# Patient Record
Sex: FEMALE | Race: WHITE | NOT HISPANIC OR LATINO | URBAN - METROPOLITAN AREA
[De-identification: names, ages, dates, MRNs, and addresses within clinical notes are randomized per-mention and may not be internally consistent; named-entity substitution may affect disease eponyms.]

---

## 2020-06-11 ENCOUNTER — OFFICE VISIT (OUTPATIENT)
Dept: URBAN - METROPOLITAN AREA CLINIC 60 | Facility: CLINIC | Age: 70
End: 2020-06-11

## 2022-07-09 ENCOUNTER — TELEPHONE ENCOUNTER (OUTPATIENT)
Dept: URBAN - METROPOLITAN AREA CLINIC 121 | Facility: CLINIC | Age: 72
End: 2022-07-09

## 2022-07-09 RX ORDER — UBIDECARENONE 200 MG
CAPSULE ORAL ONCE A DAY
Refills: 0 | OUTPATIENT
Start: 2013-06-14 | End: 2018-10-05

## 2022-07-09 RX ORDER — DEXTROMETHORPHAN POLISTIREX 30 MG/5 ML
SUSPENSION, EXTENDED RELEASE 12 HR ORAL ONCE A DAY
Refills: 0 | OUTPATIENT
Start: 2013-06-14 | End: 2018-10-05

## 2022-07-09 RX ORDER — ASPIRIN 81 MG/1
TABLET, COATED ORAL ONCE A DAY
Refills: 0 | OUTPATIENT
Start: 2018-10-05 | End: 2020-06-11

## 2022-07-09 RX ORDER — DEXTROMETHORPHAN POLISTIREX 30 MG/5 ML
SUSPENSION, EXTENDED RELEASE 12 HR ORAL ONCE A DAY
Refills: 0 | OUTPATIENT
Start: 2018-10-05 | End: 2020-06-11

## 2022-07-09 RX ORDER — OMEPRAZOLE 40 MG/1
CAPSULE, DELAYED RELEASE ORAL ONCE A DAY
Refills: 0 | OUTPATIENT
Start: 2020-03-05 | End: 2020-06-11

## 2022-07-09 RX ORDER — PAROXETINE HYDROCHLORIDE 20 MG/1
TABLET, FILM COATED ORAL ONCE A DAY
Refills: 0 | OUTPATIENT
Start: 2018-10-05 | End: 2020-06-11

## 2022-07-09 RX ORDER — LEVOTHYROXINE SODIUM 88 MCG
TABLET ORAL ONCE A DAY
Refills: 0 | OUTPATIENT
Start: 2018-10-05 | End: 2020-06-11

## 2022-07-09 RX ORDER — ASPIRIN 81 MG/1
TABLET, COATED ORAL ONCE A DAY
Refills: 0 | OUTPATIENT
Start: 2013-06-14 | End: 2018-10-05

## 2022-07-09 RX ORDER — SIMVASTATIN 20 MG/1
TABLET, FILM COATED ORAL ONCE A DAY
Refills: 0 | OUTPATIENT
Start: 2018-10-05 | End: 2020-06-11

## 2022-07-09 RX ORDER — ASPIRIN/ACETAMINOPHEN/CAFFEINE 250-250-65
TABLET ORAL ONCE A DAY
Refills: 0 | OUTPATIENT
Start: 2013-06-14 | End: 2018-10-05

## 2022-07-10 ENCOUNTER — TELEPHONE ENCOUNTER (OUTPATIENT)
Dept: URBAN - METROPOLITAN AREA CLINIC 121 | Facility: CLINIC | Age: 72
End: 2022-07-10

## 2022-07-10 RX ORDER — LEVOTHYROXINE SODIUM 88 MCG
TABLET ORAL ONCE A DAY
Refills: 0 | Status: ACTIVE | COMMUNITY
Start: 2020-06-11

## 2022-07-10 RX ORDER — SODIUM SULFATE, POTASSIUM SULFATE, MAGNESIUM SULFATE 17.5; 3.13; 1.6 G/ML; G/ML; G/ML
SOLUTION, CONCENTRATE ORAL TAKE AS DIRECTED
Refills: 0 | Status: ACTIVE | COMMUNITY
Start: 2013-06-14

## 2022-07-10 RX ORDER — DEXTROMETHORPHAN POLISTIREX 30 MG/5 ML
SUSPENSION, EXTENDED RELEASE 12 HR ORAL ONCE A DAY
Refills: 0 | Status: ACTIVE | COMMUNITY
Start: 2020-06-11

## 2022-07-10 RX ORDER — PAROXETINE HYDROCHLORIDE 20 MG/1
TABLET, FILM COATED ORAL ONCE A DAY
Refills: 0 | Status: ACTIVE | COMMUNITY
Start: 2020-06-11

## 2022-07-10 RX ORDER — OMEPRAZOLE 40 MG/1
CAPSULE, DELAYED RELEASE ORAL ONCE A DAY
Refills: 0 | Status: ACTIVE | COMMUNITY
Start: 2020-06-11

## 2022-07-10 RX ORDER — VIT A/VIT C/VIT E/ZINC/COPPER 2148-113
TABLET ORAL ONCE A DAY
Refills: 0 | Status: ACTIVE | COMMUNITY
Start: 2020-06-11

## 2022-07-10 RX ORDER — NYSTATIN 100000 [USP'U]/ML
SUSPENSION ORAL
Refills: 0 | Status: ACTIVE | COMMUNITY
Start: 2020-06-11

## 2022-07-10 RX ORDER — HYDROCODONE BITARTRATE AND ACETAMINOPHEN 325; 10 MG/1; MG/1
TABLET ORAL AS NEEDED
Refills: 0 | Status: ACTIVE | COMMUNITY
Start: 2020-06-11

## 2022-07-10 RX ORDER — SIMVASTATIN 40 MG/1
TABLET, FILM COATED ORAL ONCE A DAY
Refills: 0 | Status: ACTIVE | COMMUNITY
Start: 2020-06-11

## 2022-07-10 RX ORDER — ASPIRIN 81 MG/1
TABLET, COATED ORAL ONCE A DAY
Refills: 0 | Status: ACTIVE | COMMUNITY
Start: 2020-06-11

## 2022-10-28 ENCOUNTER — WEB ENCOUNTER (OUTPATIENT)
Dept: URBAN - METROPOLITAN AREA CLINIC 60 | Facility: CLINIC | Age: 72
End: 2022-10-28

## 2022-11-03 ENCOUNTER — DASHBOARD ENCOUNTERS (OUTPATIENT)
Age: 72
End: 2022-11-03

## 2022-11-03 ENCOUNTER — OFFICE VISIT (OUTPATIENT)
Dept: URBAN - METROPOLITAN AREA CLINIC 60 | Facility: CLINIC | Age: 72
End: 2022-11-03
Payer: MEDICARE

## 2022-11-03 ENCOUNTER — WEB ENCOUNTER (OUTPATIENT)
Dept: URBAN - METROPOLITAN AREA CLINIC 60 | Facility: CLINIC | Age: 72
End: 2022-11-03

## 2022-11-03 VITALS
OXYGEN SATURATION: 97 % | TEMPERATURE: 97.6 F | HEART RATE: 52 BPM | DIASTOLIC BLOOD PRESSURE: 68 MMHG | SYSTOLIC BLOOD PRESSURE: 140 MMHG | HEIGHT: 64 IN | WEIGHT: 214.6 LBS | BODY MASS INDEX: 36.64 KG/M2 | RESPIRATION RATE: 12 BRPM

## 2022-11-03 DIAGNOSIS — K30 FUNCTIONAL DYSPEPSIA: ICD-10-CM

## 2022-11-03 PROBLEM — 3696007: Status: ACTIVE | Noted: 2022-11-03

## 2022-11-03 PROCEDURE — 99213 OFFICE O/P EST LOW 20 MIN: CPT | Performed by: NURSE PRACTITIONER

## 2022-11-03 RX ORDER — OMEPRAZOLE 40 MG/1
CAPSULE, DELAYED RELEASE ORAL ONCE A DAY
Refills: 0 | Status: ACTIVE | COMMUNITY
Start: 2020-06-11

## 2022-11-03 RX ORDER — SIMVASTATIN 40 MG/1
TABLET, FILM COATED ORAL ONCE A DAY
Refills: 0 | Status: ACTIVE | COMMUNITY
Start: 2020-06-11

## 2022-11-03 RX ORDER — DEXTROMETHORPHAN POLISTIREX 30 MG/5 ML
SUSPENSION, EXTENDED RELEASE 12 HR ORAL ONCE A DAY
Refills: 0 | Status: ACTIVE | COMMUNITY
Start: 2020-06-11

## 2022-11-03 RX ORDER — VIT A/VIT C/VIT E/ZINC/COPPER 2148-113
TABLET ORAL ONCE A DAY
Refills: 0 | Status: ACTIVE | COMMUNITY
Start: 2020-06-11

## 2022-11-03 RX ORDER — AMLODIPINE BESYLATE 10 MG/1
1 TABLET TABLET ORAL ONCE A DAY
Status: ACTIVE | COMMUNITY

## 2022-11-03 RX ORDER — LEVOTHYROXINE SODIUM 88 MCG
TABLET ORAL ONCE A DAY
Refills: 0 | Status: ACTIVE | COMMUNITY
Start: 2020-06-11

## 2022-11-03 RX ORDER — PAROXETINE HYDROCHLORIDE 20 MG/1
TABLET, FILM COATED ORAL ONCE A DAY
Refills: 0 | Status: ACTIVE | COMMUNITY
Start: 2020-06-11

## 2022-11-03 RX ORDER — ASPIRIN 81 MG/1
TABLET, COATED ORAL ONCE A DAY
Refills: 0 | Status: ACTIVE | COMMUNITY
Start: 2020-06-11

## 2022-11-03 RX ORDER — PANTOPRAZOLE SODIUM 40 MG/1
1 TABLET TABLET, DELAYED RELEASE ORAL ONCE A DAY
Qty: 90 TABLET | Refills: 2 | OUTPATIENT
Start: 2022-11-03

## 2022-11-03 NOTE — HPI-TODAY'S VISIT:
Ms. Ugalde is a pleasant 72-year-old female evaluated with complaint of vomiting and diarrhea. She was previously evaluated 06/11/2020, records under previous EHR are unavailable.  Today, she reports history of abdominal cramping with vomiting and diarrhea occuring on a single day about once every 5 months.  In September 2022 was having symptoms weekly.    She HIDA and CT scan that were both negative. When having vomiting, will be of food recently consumed.  Will occur in the evening. Symptoms unrelated to eating or BM.  Having daily BM of soft consistency. Taking omeprazole 40 mg once daily.

## 2022-11-03 NOTE — HPI-PREVIOUS IMAGING
HIDA scan 10/24/2022 Pressure: No scintigraphic evidence for acute cholecystitis.  The gallbladder ejection fraction is 66% at 30 minutes.  CT abdomen and pelvis without contrast 10/18/2022 Impression: No evidence of obstructive uropathy.  Renal, ureteral or bladder calculi are not apparent. Findings also include some mucosal thickening involving the distal esophagus. See records for full results.

## 2022-11-03 NOTE — HPI-PREVIOUS PROCEDURES
Upper endoscopy 05/19/2020 at [facility] [place] findings: Normal esophagus, 2 cm hiatal hernia, chronic gastritis, few gastric polyps, normal examined duodenum, none-obstruction Schatzki ring.  Pathology findings: Reactive gastropathy, mild chronic active gastritis, H. pylori negative.  Polyp found to be fundic gland polyp.  GE junction having squamocolumnar mucosa with chronic inflammation, reflux-type changes and focal intestinal metaplasia.  Colonoscopy 10/16/2018 at [facility] [place] findings: Internal hemorrhoids, mild diverticulosis, no specimens collected, surveillance due in 5 years.

## 2024-06-21 ENCOUNTER — TELEPHONE ENCOUNTER (OUTPATIENT)
Dept: URBAN - METROPOLITAN AREA CLINIC 64 | Facility: CLINIC | Age: 74
End: 2024-06-21

## 2024-07-25 ENCOUNTER — OFFICE VISIT (OUTPATIENT)
Dept: URBAN - METROPOLITAN AREA CLINIC 60 | Facility: CLINIC | Age: 74
End: 2024-07-25
Payer: MEDICARE

## 2024-07-25 VITALS
HEART RATE: 69 BPM | WEIGHT: 211.8 LBS | OXYGEN SATURATION: 98 % | RESPIRATION RATE: 12 BRPM | BODY MASS INDEX: 36.16 KG/M2 | DIASTOLIC BLOOD PRESSURE: 66 MMHG | SYSTOLIC BLOOD PRESSURE: 138 MMHG | TEMPERATURE: 99 F | HEIGHT: 64 IN

## 2024-07-25 DIAGNOSIS — K22.70 BARRETT'S ESOPHAGUS WITHOUT DYSPLASIA: ICD-10-CM

## 2024-07-25 DIAGNOSIS — Z12.11 SCREENING FOR MALIGNANT NEOPLASM OF COLON: ICD-10-CM

## 2024-07-25 DIAGNOSIS — K30 FUNCTIONAL DYSPEPSIA: ICD-10-CM

## 2024-07-25 PROCEDURE — 99214 OFFICE O/P EST MOD 30 MIN: CPT

## 2024-07-25 RX ORDER — PAROXETINE 20 MG/1
TABLET, FILM COATED ORAL
Qty: 90 TABLET | Status: ACTIVE | COMMUNITY

## 2024-07-25 RX ORDER — LEVOTHYROXINE SODIUM 0.11 MG/1
TABLET ORAL
Qty: 90 TABLET | Status: ACTIVE | COMMUNITY

## 2024-07-25 RX ORDER — IRBESARTAN 150 MG/1
TAKE ONE TABLET BY MOUTH ONE TIME DAILY TABLET ORAL
Qty: 90 UNSPECIFIED | Refills: 0 | Status: ACTIVE | COMMUNITY

## 2024-07-25 RX ORDER — CICLOPIROX 80 MG/ML
SOLUTION TOPICAL
Qty: 13.2 MILLILITER | Status: ACTIVE | COMMUNITY

## 2024-07-25 RX ORDER — SOD SULF/POT CHLORIDE/MAG SULF 1.479 G
12 TABLETS TABLET ORAL
Qty: 24 | Refills: 0 | OUTPATIENT
Start: 2024-07-25 | End: 2024-07-26

## 2024-07-25 RX ORDER — BUPROPION HYDROCHLORIDE 150 MG/1
TABLET, EXTENDED RELEASE ORAL
Qty: 90 TABLET | Status: ACTIVE | COMMUNITY

## 2024-07-25 RX ORDER — ATORVASTATIN CALCIUM 10 MG/1
TABLET, FILM COATED ORAL
Qty: 90 TABLET | Status: ACTIVE | COMMUNITY

## 2024-07-25 RX ORDER — B-COMPLEX WITH VITAMIN C
AS DIRECTED TABLET ORAL
Status: ACTIVE | COMMUNITY

## 2024-07-25 RX ORDER — ANTIOX #8/OM3/DHA/EPA/LUT/ZEAX 250-2.5 MG
AS DIRECTED CAPSULE ORAL
Status: ACTIVE | COMMUNITY

## 2024-07-25 NOTE — HPI-TODAY'S VISIT:
Patient is a 74-year-old female who presents today for colonoscopy evaluation.  At today's visit patient states she has no new GI complaints or concerns.  Her last colonoscopy was in 2018 with 5-year recall.  Currently patient is requesting Sutab for her colonoscopy prep as opposed to magnesium citrate which she used for her last prep.  Patient states that she has a very difficult time tolerating liquid prep.  Last EGD also reviewed with patient which showed focal intestinal metaplasia with a 2-year recall.  Patient was on pantoprazole 40 mg daily however she was instructed to discontinue this medication after a hospital stay due to anemia and acute renal failure.  Apparently the nephrologist at the hospital advised her to stop this medication. Most recent labs also reviewed with patient. Discussed reason for pantoprazole and ultimately will repeat EGD. Patient denies fever, dysphagia, acid reflux, change in appetite, abdominal pain, nausea, vomiting, diarrhea, constipation, hematemesis, hematochezia, melena, change in stool frequency/caliber and unintentional weight loss/gain. She also denies history of stroke, heart attack, pacemaker, defibrillator, stents, blood thinners, COPD, asthma and seizures. She has a history of JOHNNY and currently uses a CPAP nightly. . Colonoscopy 10/16/2018 - Nonbleeding internal hemorrhoids - Mild diverticulosis in the left colon, no evidence of diverticular bleeding - No specimens collected - Repeat colonoscopy in 5 years . EGD 5/19/2020 - Normal esophagus - 2 cm hiatal hernia - Chronic gastritis, biopsy - Few gastric polyps, biopsy - Normal examined duodenum - Nonobstructing Schatzki's ring, biopsied - Pathology: Stomach antrum and body biopsy-reactive gastric mucosa with mild chronic active gastritis, negative for H. pylori; stomach body polyp biopsy-fundic gland polyp, negative for H. pylori; GE junction biopsy-squamocolumnar junction mucosa with chronic inflammation, reflux type changes and focal intestinal metaplasia, negative for fungal microorganisms, no evidence of dysplasia or malignancy - Repeat EGD in 2 years . Labs 7/5/2024 - Iron panel: TIBC 239 otherwise unremarkable - Renal function panel: Glucose 108, creatinine 1.03, GFR 56 otherwise unremarkable - Urinalysis within normal limits - PTH within normal limits - Urine protein/creatinine ratio within normal limits - CBC within normal limits . Labs 6/20/2024 - CMP: Glucose 108, GFR 55, total protein 6.0 otherwise unremarkable - Lipid panel: Total cholesterol 201, triglycerides 237, non-HDL cholesterol 149,  otherwise unremarkable - CBCD: Hemoglobin 11.7, RDW 15.3 otherwise normal

## 2024-08-01 ENCOUNTER — LAB OUTSIDE AN ENCOUNTER (OUTPATIENT)
Dept: URBAN - METROPOLITAN AREA CLINIC 60 | Facility: CLINIC | Age: 74
End: 2024-08-01

## 2024-08-14 ENCOUNTER — TELEPHONE ENCOUNTER (OUTPATIENT)
Dept: URBAN - METROPOLITAN AREA CLINIC 63 | Facility: CLINIC | Age: 74
End: 2024-08-14

## 2024-08-30 ENCOUNTER — OFFICE VISIT (OUTPATIENT)
Dept: URBAN - METROPOLITAN AREA SURGERY CENTER 4 | Facility: SURGERY CENTER | Age: 74
End: 2024-08-30
Payer: MEDICARE

## 2024-08-30 DIAGNOSIS — K22.89 OTHER SPECIFIED DISEASE OF ESOPHAGUS: ICD-10-CM

## 2024-08-30 DIAGNOSIS — K64.2 THIRD DEGREE HEMORRHOIDS: ICD-10-CM

## 2024-08-30 DIAGNOSIS — K63.5 POLYP OF ASCENDING COLON, UNSPECIFIED TYPE: ICD-10-CM

## 2024-08-30 DIAGNOSIS — K44.9 DIAPHRAGMATIC HERNIA WITHOUT OBSTRUCTION OR GANGRENE: ICD-10-CM

## 2024-08-30 DIAGNOSIS — Z86.010 PERSONAL HISTORY OF COLONIC POLYPS: ICD-10-CM

## 2024-08-30 DIAGNOSIS — K57.30 DIVERTICULOSIS OF LARGE INTESTINE WITHOUT PERFORATION OR ABSCESS WITHOUT BLEEDING: ICD-10-CM

## 2024-08-30 DIAGNOSIS — K29.00 ACUTE GASTRITIS WITHOUT BLEEDING: ICD-10-CM

## 2024-08-30 PROCEDURE — 45385 COLONOSCOPY W/LESION REMOVAL: CPT | Performed by: INTERNAL MEDICINE

## 2024-08-30 PROCEDURE — 43239 EGD BIOPSY SINGLE/MULTIPLE: CPT | Performed by: INTERNAL MEDICINE

## 2024-08-30 RX ORDER — BUPROPION HYDROCHLORIDE 150 MG/1
TABLET, EXTENDED RELEASE ORAL
Qty: 90 TABLET | Status: ACTIVE | COMMUNITY

## 2024-08-30 RX ORDER — B-COMPLEX WITH VITAMIN C
AS DIRECTED TABLET ORAL
Status: ACTIVE | COMMUNITY

## 2024-08-30 RX ORDER — LEVOTHYROXINE SODIUM 0.11 MG/1
TABLET ORAL
Qty: 90 TABLET | Status: ACTIVE | COMMUNITY

## 2024-08-30 RX ORDER — ATORVASTATIN CALCIUM 10 MG/1
TABLET, FILM COATED ORAL
Qty: 90 TABLET | Status: ACTIVE | COMMUNITY

## 2024-08-30 RX ORDER — IRBESARTAN 150 MG/1
TAKE ONE TABLET BY MOUTH ONE TIME DAILY TABLET ORAL
Qty: 90 UNSPECIFIED | Refills: 0 | Status: ACTIVE | COMMUNITY

## 2024-08-30 RX ORDER — PAROXETINE 20 MG/1
TABLET, FILM COATED ORAL
Qty: 90 TABLET | Status: ACTIVE | COMMUNITY

## 2024-08-30 RX ORDER — CICLOPIROX 80 MG/ML
SOLUTION TOPICAL
Qty: 13.2 MILLILITER | Status: ACTIVE | COMMUNITY

## 2024-08-30 RX ORDER — ESOMEPRAZOLE MAGNESIUM 40 MG/1
1 CAPSULE CAPSULE, DELAYED RELEASE ORAL ONCE A DAY
Qty: 30 | Refills: 3 | OUTPATIENT
Start: 2024-08-30

## 2024-08-30 RX ORDER — ANTIOX #8/OM3/DHA/EPA/LUT/ZEAX 250-2.5 MG
AS DIRECTED CAPSULE ORAL
Status: ACTIVE | COMMUNITY

## 2024-09-18 ENCOUNTER — OFFICE VISIT (OUTPATIENT)
Dept: URBAN - METROPOLITAN AREA CLINIC 60 | Facility: CLINIC | Age: 74
End: 2024-09-18
Payer: MEDICARE

## 2024-09-18 VITALS
TEMPERATURE: 98.8 F | RESPIRATION RATE: 12 BRPM | DIASTOLIC BLOOD PRESSURE: 64 MMHG | SYSTOLIC BLOOD PRESSURE: 124 MMHG | OXYGEN SATURATION: 98 % | WEIGHT: 217.8 LBS | HEIGHT: 64 IN | BODY MASS INDEX: 37.18 KG/M2 | HEART RATE: 81 BPM

## 2024-09-18 DIAGNOSIS — K29.00 ACUTE EROSIVE GASTRITIS: ICD-10-CM

## 2024-09-18 DIAGNOSIS — D12.2 ADENOMATOUS POLYP OF ASCENDING COLON: ICD-10-CM

## 2024-09-18 DIAGNOSIS — K25.3 ACUTE GASTRIC ULCER WITHOUT HEMORRHAGE OR PERFORATION: ICD-10-CM

## 2024-09-18 PROCEDURE — 99213 OFFICE O/P EST LOW 20 MIN: CPT

## 2024-09-18 RX ORDER — ATORVASTATIN CALCIUM 10 MG/1
TABLET, FILM COATED ORAL
Qty: 90 TABLET | Status: ACTIVE | COMMUNITY

## 2024-09-18 RX ORDER — BUPROPION HYDROCHLORIDE 150 MG/1
TABLET, EXTENDED RELEASE ORAL
Qty: 90 TABLET | Status: ACTIVE | COMMUNITY

## 2024-09-18 RX ORDER — B-COMPLEX WITH VITAMIN C
AS DIRECTED TABLET ORAL
Status: ACTIVE | COMMUNITY

## 2024-09-18 RX ORDER — IRBESARTAN 150 MG/1
TAKE ONE TABLET BY MOUTH ONE TIME DAILY TABLET ORAL
Qty: 90 UNSPECIFIED | Refills: 0 | Status: ACTIVE | COMMUNITY

## 2024-09-18 RX ORDER — CICLOPIROX 80 MG/ML
SOLUTION TOPICAL
Qty: 13.2 MILLILITER | Status: ACTIVE | COMMUNITY

## 2024-09-18 RX ORDER — PAROXETINE 20 MG/1
TABLET, FILM COATED ORAL
Qty: 90 TABLET | Status: ACTIVE | COMMUNITY

## 2024-09-18 RX ORDER — ESOMEPRAZOLE MAGNESIUM 40 MG/1
1 CAPSULE CAPSULE, DELAYED RELEASE ORAL ONCE A DAY
Qty: 30 | Refills: 3 | Status: ACTIVE | COMMUNITY
Start: 2024-08-30

## 2024-09-18 RX ORDER — ANTIOX #8/OM3/DHA/EPA/LUT/ZEAX 250-2.5 MG
AS DIRECTED CAPSULE ORAL
Status: ACTIVE | COMMUNITY

## 2024-09-18 RX ORDER — LEVOTHYROXINE SODIUM 0.11 MG/1
TABLET ORAL
Qty: 90 TABLET | Status: ACTIVE | COMMUNITY

## 2024-09-18 NOTE — HPI-TODAY'S VISIT:
Patient is a 74-year-old female with past medical history of dyspepsia and Lewis's esophagus who presents today for follow-up on EGD and colonoscopy.  At today's visit patient states she has no new GI complaints or concerns.  Reviewed most recent EGD and colonoscopy with patient.  Patient states she was started on esomeprazole by Dr. Lindsay after EGD procedure.  She tells me that the medication is working well and she has not had any issues.  Patient denies fever, dysphagia, acid reflux, change in appetite, abdominal pain, nausea, vomiting, diarrhea, constipation, hematemesis, hematochezia, melena, change in stool frequency/caliber and unintentional weight loss/gain. . Colonoscopy 8/30/2024 - Two 4 to 10 mm polyps in the ascending colon, removed with cold snare - Diverticulosis in the entire examined colon - Internal hemorrhoids - Pathology: Ascending colon polyp-tubular adenoma - Repeat colonoscopy in 3 years . EGD 8/30/2024 - Z-line irregular at GE junction, biopsied - Nonbleeding gastric ulcers with no stigmata of bleeding, biopsied - Acute erosive gastritis characterized by erythema granularity and adherent blood, biopsied - Normal examined duodenum - 2 cm hiatal hernia - Pathology: Stomach antrum biopsy-foveolar hyperplasia and focal healing erosion consistent with ulcer board, no evidence of H. pylori or intestinal metaplasia, negative for dysplasia or malignancy; stomach body biopsy-chronic gastritis, nonspecific, no evidence of H. pylori or intestinal metaplasia, negative for dysplasia or malignancy; distal esophagus biopsy-squamous mucosa with reflux type changes, no columnar mucosa identified, no evidence of Lewis's esophagus or EOE, negative for infectious organisms, dysplasia or malignancy . Colonoscopy 10/16/2018 - Nonbleeding internal hemorrhoids - Mild diverticulosis in the left colon, no evidence of diverticular bleeding - No specimens collected - Repeat colonoscopy in 5 years . EGD 5/19/2020 - Normal esophagus - 2 cm hiatal hernia - Chronic gastritis, biopsy - Few gastric polyps, biopsy - Normal examined duodenum - Nonobstructing Schatzki's ring, biopsied - Pathology: Stomach antrum and body biopsy-reactive gastric mucosa with mild chronic active gastritis, negative for H. pylori; stomach body polyp biopsy-fundic gland polyp, negative for H. pylori; GE junction biopsy-squamocolumnar junction mucosa with chronic inflammation, reflux type changes and focal intestinal metaplasia, negative for fungal microorganisms, no evidence of dysplasia or malignancy - Repeat EGD in 2 years . Labs 9/5/2024 - BMP: Glucose 128, creatinine 1.07, GFR 54 otherwise unremarkable . Labs 7/5/2024 - Iron panel: TIBC 239 otherwise unremarkable - Renal function panel: Glucose 108, creatinine 1.03, GFR 56 otherwise unremarkable - Urinalysis within normal limits - PTH within normal limits - Urine protein/creatinine ratio within normal limits - CBC within normal limits . Labs 6/20/2024 - CMP: Glucose 108, GFR 55, total protein 6.0 otherwise unremarkable - Lipid panel: Total cholesterol 201, triglycerides 237, non-HDL cholesterol 149,  otherwise unremarkable - CBCD: Hemoglobin 11.7, RDW 15.3 otherwise normal